# Patient Record
Sex: MALE | Race: WHITE | NOT HISPANIC OR LATINO | Employment: OTHER | URBAN - METROPOLITAN AREA
[De-identification: names, ages, dates, MRNs, and addresses within clinical notes are randomized per-mention and may not be internally consistent; named-entity substitution may affect disease eponyms.]

---

## 2023-09-08 PROBLEM — F84.0 AUTISM DISORDER (HHS-HCC): Status: ACTIVE | Noted: 2023-09-08

## 2023-09-08 PROBLEM — D64.9 ANEMIA: Status: ACTIVE | Noted: 2023-09-08

## 2023-09-08 PROBLEM — K66.0 INTESTINAL ADHESIONS: Status: ACTIVE | Noted: 2023-09-08

## 2023-09-08 PROBLEM — K08.109 EDENTULOUS: Status: ACTIVE | Noted: 2023-09-08

## 2023-09-08 PROBLEM — K59.00 CONSTIPATION: Status: ACTIVE | Noted: 2023-09-08

## 2023-09-08 PROBLEM — F98.4 STEREOTYPIC MOVEMENT DISORDER: Status: ACTIVE | Noted: 2023-09-08

## 2023-09-08 PROBLEM — K21.9 GERD (GASTROESOPHAGEAL REFLUX DISEASE): Status: ACTIVE | Noted: 2023-09-08

## 2023-09-08 PROBLEM — G25.71 AKATHISIA: Status: ACTIVE | Noted: 2023-09-08

## 2023-09-08 PROBLEM — R25.1 TREMOR: Status: ACTIVE | Noted: 2023-09-08

## 2023-09-08 PROBLEM — G24.4 OROFACIAL DYSKINESIA: Status: ACTIVE | Noted: 2023-09-08

## 2023-09-08 PROBLEM — F50.9 EATING DISORDER: Status: ACTIVE | Noted: 2023-09-08

## 2023-09-08 PROBLEM — F73 PROFOUND INTELLECTUAL DISABILITY: Status: ACTIVE | Noted: 2023-09-08

## 2023-09-08 PROBLEM — F63.9 IMPULSE CONTROL DISORDER: Status: ACTIVE | Noted: 2023-09-08

## 2023-09-08 PROBLEM — K56.600 PARTIAL BOWEL OBSTRUCTION (MULTI): Status: ACTIVE | Noted: 2023-09-08

## 2023-09-08 PROBLEM — F98.3 PICA OF INFANCY AND CHILDHOOD: Status: ACTIVE | Noted: 2023-09-08

## 2023-09-08 RX ORDER — DIVALPROEX SODIUM 125 MG/1
CAPSULE, COATED PELLETS ORAL
COMMUNITY
Start: 2019-02-12 | End: 2023-11-15 | Stop reason: SDUPTHER

## 2023-09-08 RX ORDER — PETROLATUM,WHITE/LANOLIN
OINTMENT (GRAM) TOPICAL
COMMUNITY
Start: 2018-11-15

## 2023-09-08 RX ORDER — POLYETHYLENE GLYCOL 3350 17 G/17G
POWDER, FOR SOLUTION ORAL
COMMUNITY
Start: 2019-08-12

## 2023-09-08 RX ORDER — RISPERIDONE 1 MG/1
TABLET ORAL
COMMUNITY
Start: 2016-11-09 | End: 2023-11-15 | Stop reason: SDUPTHER

## 2023-09-08 RX ORDER — CLONAZEPAM 0.5 MG/1
TABLET ORAL
COMMUNITY
Start: 2017-11-24 | End: 2023-11-15 | Stop reason: SDUPTHER

## 2023-09-08 RX ORDER — ACETAMINOPHEN 500 MG
TABLET ORAL
COMMUNITY

## 2023-09-08 RX ORDER — MAG HYDROX/ALUMINUM HYD/SIMETH 200-200-20
SUSPENSION, ORAL (FINAL DOSE FORM) ORAL
COMMUNITY

## 2023-09-08 RX ORDER — ZINC GLUCONATE 100 MG
1 TABLET ORAL DAILY
COMMUNITY
Start: 2020-04-22

## 2023-09-08 RX ORDER — FERROUS SULFATE 325(65) MG
1 TABLET ORAL DAILY
COMMUNITY
Start: 2019-10-31

## 2023-09-08 RX ORDER — METOPROLOL TARTRATE 100 MG/1
1 TABLET ORAL 2 TIMES DAILY
COMMUNITY
Start: 2020-11-30 | End: 2023-11-15 | Stop reason: SDUPTHER

## 2023-09-08 RX ORDER — BISACODYL 5 MG
TABLET, DELAYED RELEASE (ENTERIC COATED) ORAL
COMMUNITY
Start: 2019-08-12

## 2023-09-08 RX ORDER — FAMOTIDINE 40 MG/1
TABLET, FILM COATED ORAL
COMMUNITY
Start: 2019-08-12

## 2023-09-08 RX ORDER — MAGNESIUM L-LACTATE 84 MG
TABLET, EXTENDED RELEASE ORAL
COMMUNITY
Start: 2019-08-12

## 2023-09-08 RX ORDER — PNV NO.95/FERROUS FUM/FOLIC AC 28MG-0.8MG
TABLET ORAL
COMMUNITY

## 2023-09-08 RX ORDER — POLYETHYLENE GLYCOL 3350
GRANULES (GRAM) MISCELLANEOUS
COMMUNITY
Start: 2018-11-15

## 2023-11-14 ENCOUNTER — TELEMEDICINE (OUTPATIENT)
Dept: BEHAVIORAL HEALTH | Facility: CLINIC | Age: 48
End: 2023-11-14
Payer: MEDICARE

## 2023-11-14 DIAGNOSIS — F63.9 IMPULSE CONTROL DISORDER: ICD-10-CM

## 2023-11-14 DIAGNOSIS — T43.505A ACUTE NEUROLEPTIC-INDUCED AKATHISIA: ICD-10-CM

## 2023-11-14 DIAGNOSIS — G25.71 ACUTE NEUROLEPTIC-INDUCED AKATHISIA: ICD-10-CM

## 2023-11-14 PROCEDURE — 99214 OFFICE O/P EST MOD 30 MIN: CPT | Performed by: PSYCHIATRY & NEUROLOGY

## 2023-11-14 NOTE — PROGRESS NOTES
"FaheemJosé    :  79        Patient Discussion/Summary    Session today included education of the patient, and caregivers, re the signs and symptoms of illness; and the risks, potential benefits, and expected and unexpected side effects of prescribed medications.      Chief Complaint    An interactive audio and video telecommunication system which permits real time communications between the patient (at the originating site) and provider (at the distant site) was utilized to provide this telehealth service.      \"He has been stable lately.\"     DIAGNOSES       Impulse control disorder, not otherwise specified  Autism spectrum disorder  Pica        Intellectual disability, profound, of unknown etiology       Gastroesophageal reflux disease  History of constipation  Iron deficiency anemia           PRESENT FOR APPOINTMENT          Patient     Patrick Huizar,  and caregiver over the last 5-6 years.     SUBJECTIVE      Last seen May 2023    No medication changes at that visit.     His Boost nutritional supplement has been changed to as needed only.  It is given if he does not eat full meal, lately has been taken 1-2 times per week.     Seems to drink Boost without issue.     Most recent psychotropic medication change was in 2021, at which time divalproex was increased to 1000 mg twice per day.     Otherwise, no apparent changes in medications since that time     Current medications include:    Clonazepam 0.5 mg three times per day  Vitamin D3 50 mcg once a day  Vitamin B12 1000 mcg per day   Chewable vitamin with Fe  Biscodyl 5 mg per day in morning  Ferrous sulfate 325 mg per day  Mag-tab 84 mg twice per day   Metoprolol tartrate 100 mg twice per day   Omeprazole 20 mg per day  Divalproex 1000 mg twice per day (sprinkles)   Glucosamine 1500 mg per day  Polyethylene glycol 17 grams twice per day  Risperidone 1 mg, two (2 mg) at bedtime  Zinc 100 mg per day    MOM prn if no BM after 3 " days    Mr. Mayen has been stable since his last visit.    There are few challenging behaviors since that time.   Although he continues to do some pacing, it seems unchanged and stable.  He is still standing or pacing at least 75% of his waking hours.   He is able to sit, but his longest duration of sitting is 15-20 minutes.   He is able to sit through a meal, but will get up to pace as soon as the food is gone.    He generally will wander room to room, walking slowly.   His pacing is not driven or rapid.   Some of pacing seems food-seeking, but most is not.   In the kitchen, he will take food if he sees it, and will sometimes look in the garbage, but does not open the refrigerator or pantry.      He has a short attention span for everything, and does not seem much interested in anything other than food. Does not like to hold objects unless they are food or related to food.     Fewer episodes of trying to take food from others, and frequency of pica attempts have been decreased.     Not much recent observation of saliva smearing. No recent ingestion of feces.     His sleep is stable.   Sleeps about 7-9 hours per night. Sometimes wakes on his own in the morning, or is awakened by staff, no later than 9 am. Occasional nap after lunch.       Recent weight is 143 pounds.        He is still not attending an off site day program. Of his 7 house mates, 5 attend a day program.   He continues to live with the [facility] residential provider since October 2018; at [facility] with 7 other men.      He wears Attends full time.     Has been having nearly daily BM     He has no speech, and little recent non-speech vocalization throughout his waking day.     Generally cooperative with hygiene.     Noted that he may develop some loose stools following ingestion of dairy products.          His guardian Deisy Alegria has moved to [place], but is actively engaged in Mr. Mayen' care, attending all team meetings     REVIEW OF  SYMPTOMS     No interim medical issues to report.     No interim urgent care or ED visits, or hospitalizations since last visit.     One interim dental visit. He is edentulous, but has annual visit.     Has a mechanical soft diet. Continues to try to take food from others at times.    No recent choking or coughing episodes.    There have been no changes reported in bowel or bladder function. No recent reports of constipation. He appears to have BM daily, or sometimes every other day, and can take MOM if no BM after two days.   Noted by staff that he may have loose stools after eating milk products, so these are somewhat avoided. He will usually have 60% of BMs on commode, with a routine toileting schedule. He will also sometimes urinate on the commode at times, but Attends are usually wet between visits.     PCP is Dr. Hernandez.          He has routine and mostly successful podiatry visits. Podiatrist makes home visits.   He is cooperative with care if given a snack during the visit.     No seizures in interim.   Had visit with Dr[facility] Pandya at [facility], had labs, no change in medications    Some ongoing rubbing of his eyes.    In the past, he would rub both of his eyes repeatedly, and is at risk for recurrent conjunctivitis.    No interim episodes of conjunctivitis     His current diagnostic impression includes:     GERD  Constipation (moderate)  Pervasive developmental disorder  Autism  Pica  Edentulous   Olgivie syndrome (megacolon)    No observed falls in interim.     He is walking well lately, and still doing some spinning when he walks. May spin in place many times per day lately. Will walk from one room to another and spin a time or two on the way, will spin when he gets there, and returns to original location. Seems to spin more in unfamiliar places or in community, less at home. Continues walking more confidently without support; occasional problems with curbs or barriers. No change in gait or balance. Appears that  metoprolol has not been held since the last visit.     PAIN       Unable to accurately report pain.     MEDICAL PROBLEMS     See above              SIDE EFFECTS           ? mildly restless         CHANGE IN TREATMENT     None since last visit              COMPLIANCE           Compliance typically good but staff dependent. His meds are given in or with food; and newer staff may sometimes struggle to get medications administered.      MENTAL STATUS EXAMINATION:    He was seen briefly with Mr. Lewis today.   Seems awake and alert, well dressed and groomed.   He was in the kitchen, entered dining room, poor eye contact with video visit.       Continues some movement of lower lip.    No overt jaw movements.   No other abn movements.    Neither arms nor legs appear stiff. No tremor or drooling seen.     Did not follow any verbalized requests per video.    No speech or other vocalizations.     After several minutes, walked out of room. Did not appear unsteady walking.     BEHAVIOR             MEMORY     Could not test           SENSORY                       COMMUNICATION           Mute        AFFECT             Seemed flat             MOOD      Could not assess               THOUGHT PROCESS             THOUGHT CONTENT           Could not assess          PSYCHOTIC SYMPTOMS                      ORIENTATION            Did not look to video to called name    CONCENTRATION             CALCULATION           None    FUND OF KNOWLEDGE          Appears to have profound ID     JUDGMENT     Poor     GAIT              Slow but seems steady     EPS             None seen, no tremor, no drooling, not stiff.     AIMS              5 based on chewing movements.    LABS REVIEWED    November 2023:    Glucose 113.  BUN 19/1.0.  Ammonia normal.   VPA level 53.6.  CBC normal    October 2022:    HgbA1C 5.4%. CBC and CMP normal. Ammonia normal. VPA 70     July 2021    CMP all normal. VPA 44 mcg/ml. Ammonia normal at 37.     April 2021:    CBC  normal    January 2021:    BUN/Cr 28/0.72. K 5.2 (slightly high). Ammonia normal. VPA low normal at 34.7.     November 2020:    CBC normal. CMP all normal, including previously elevated LFTs. TSH and Vit D level normal. K+ 5.4 (? hemolysis?). VPA level lower at 34.8 mcg/ml. Ammonia now normal.     Sept 2020:    CBC normal. ALT and AST both slightly elevated. Lipids and TSH normal. HgbA1C 5.8% (slightly high). Ammonia 66 (range 16-60. VPA 69.5 (range ).     mid-August 2019:    CBC grossly normal. VPA 41, ammonia normal (low).     August 2019:    CMP grossly normal, glucose 114 (non-fasting). VPA 53.8 mcg/ml. Ammonia normal. CBC grossly normal. UA normal except for trace ketones, and slightly elevated urobilinogen.     April 2019:    CBC normal except WBC 22.7. CMP normal. Lipid panel normal (HDL slightly low). VPA 26. Fe low at 25 (range ) Vit D normal at 71. Zinc borderline low at 59 (range ).     January 2019:    CBC and CMP normal     July 2018:    HgbA1C 5.6%    April 2018:    CMP normal. Fe studies normal. WBC 3900, rest of CBC normal. VPA 74. Zinc normal at 77 ug/dl.      January 2018:    HgbA1C 5.7     October 2017:    CMP normal. CBC normal. Fe studies normal. VPA 68.8 mcg/ml. Zinc 65 (normal)    June 2017:    FBS 75        EKG             None in interim; these are difficult/impossible to obtain without GA or physical restraint    January 2017:    NSR, rate 66 bpm, QTc 370 msec    October 2015:    Sinus, rate 90 bpm, QTc 392 msec     August 2014:    Sinus tachycardia, rate 108, QTc 402 msec. (at higher dose of risperidone)     June 2013:    NSR rate 97 bpm, QTc 437 msec.     He is found in [facility] but his record does not document clonazepam Rx(Some long term care failities do not report to [facility]). No other issues.     ASSESSMENT           Stable since last visit.    Has remained at or near his best behavioral baseline.     Plan no change in medications this visit.        Seems to be  tolerating current medication regimen without serious side effects.     It remains unclear to what extent some/much of his restlessness is akathisia from risperidone, but he is also taking beta blocker to address. No apparent low blood pressure issues or dizziness or falls.     Interim labs noted.   No interim EKG.   The latter would likely require GA     No interim seizures; and no interim ED visits or hospitalizations since last visit.     No reports of Parkinsonism, no tremor, drooling, or stiffness.       PLAN             problems treated   f/u requested to prevent relapse   medications renewed/re-ordered     1. No change in medications this visit.   2. Continue VS every other day and record. Notify MD for pulse < 55 bpm, or BP < 95 systolic   3. Continue to chart bowel and urinary status, also attempts at pica  4. Would like to get EKG but not possible without GA  5.  Discussed transition to psychiatrist at [facility] who treats several of his residential peers       TREATMENT TYPE           22252, videobased counseling and coordination of care 40 minutes with > 50% c/c with staff caregivers; addressing s/s of illness; risks/benefits/side effects of medications; and behavioral approaches to illness    Return in 6 months; and as needed

## 2023-11-15 RX ORDER — DIVALPROEX SODIUM 125 MG/1
1000 CAPSULE, COATED PELLETS ORAL 2 TIMES DAILY
Qty: 480 CAPSULE | Refills: 5 | Status: SHIPPED | OUTPATIENT
Start: 2023-11-15 | End: 2024-03-20 | Stop reason: SDUPTHER

## 2023-11-15 RX ORDER — RISPERIDONE 1 MG/1
2 TABLET ORAL NIGHTLY
Qty: 60 TABLET | Refills: 5 | Status: SHIPPED | OUTPATIENT
Start: 2023-11-15 | End: 2024-03-20 | Stop reason: SDUPTHER

## 2023-11-15 RX ORDER — METOPROLOL TARTRATE 100 MG/1
100 TABLET ORAL 2 TIMES DAILY
Qty: 60 TABLET | Refills: 5 | Status: SHIPPED | OUTPATIENT
Start: 2023-11-15 | End: 2024-03-20 | Stop reason: SDUPTHER

## 2023-11-15 RX ORDER — CLONAZEPAM 0.5 MG/1
0.5 TABLET ORAL 3 TIMES DAILY
Qty: 90 TABLET | Refills: 5 | Status: SHIPPED | OUTPATIENT
Start: 2023-11-15 | End: 2024-03-20 | Stop reason: SDUPTHER

## 2023-11-21 ENCOUNTER — OFFICE VISIT (OUTPATIENT)
Dept: URGENT CARE | Facility: CLINIC | Age: 48
End: 2023-11-21
Payer: MEDICARE

## 2023-11-21 VITALS — RESPIRATION RATE: 20 BRPM | TEMPERATURE: 97 F | HEART RATE: 74 BPM | OXYGEN SATURATION: 97 %

## 2023-11-21 DIAGNOSIS — M79.672 LEFT FOOT PAIN: Primary | ICD-10-CM

## 2023-11-21 PROCEDURE — 99203 OFFICE O/P NEW LOW 30 MIN: CPT | Performed by: PHYSICIAN ASSISTANT

## 2023-11-21 NOTE — PROGRESS NOTES
Subjective   Patient ID: José Mayen is a 48 y.o. male.    Patient is a 48-year-old male who is brought by his caregiver for evaluation of bruising that was noted to the dorsal aspect of the distal left foot this morning.  Patient has significant developmental delay and resides in a group home.  Patient's caregiver noted bruising to the patient's dorsal left foot when he arrived this morning.  Staff reports no known accident or injury and the patient has been bearing weight and ambulating normally.  Staff noted no swelling to the patient's left foot and the patient has given no indication of significant pain to same.      Foot Injury     The following portions of the chart were reviewed this encounter and updated as appropriate:       Review of Systems   Musculoskeletal:         Bruising to Dorsal Left Foot     Objective   Physical Exam  Vitals and nursing note reviewed.   Constitutional:       Appearance: Normal appearance. He is normal weight.   Cardiovascular:      Pulses: Normal pulses.   Pulmonary:      Effort: Pulmonary effort is normal.      Breath sounds: Normal breath sounds.   Musculoskeletal:         General: No swelling, tenderness, deformity or signs of injury. Normal range of motion.      Comments: Ecchymosis is noted to the dorsal left foot directly overlying the distal second, third and fourth metatarsals.  There is no soft tissue edema noted to the dorsal left foot.  No crepitus or deformity is noted with palpation.  Patient demonstrates good range of motion to the left toes.  The overlying skin is intact and there is no evidence of abrasion, laceration or other skin wound.  Remainder of exam to the left foot and ankle is unremarkable.  Gait and station is noted to be stable and symmetric.   Skin:     General: Skin is warm and dry.      Capillary Refill: Capillary refill takes less than 2 seconds.      Findings: Bruising present. No erythema.   Neurological:      General: No focal deficit  present.      Mental Status: He is alert and oriented to person, place, and time.   Psychiatric:         Mood and Affect: Mood normal.         Behavior: Behavior normal.         Thought Content: Thought content normal.         Judgment: Judgment normal.     Assessment/Plan   Physical exam findings as noted above.  X-ray left foot is negative for acute findings on my review of the images.  Supportive care instructions were discussed and the patient's caregiver was provided with contact information for Dr. Star Adams.  Caregiver was encouraged to schedule an appointment with Dr. Adams for further evaluation and management if he notes the patient developing any additional symptoms.  Caregiver verbalizes excellent understanding of the above instructions.    CLINICAL IMPRESSION:  Ecchymosis Left Foot    Diagnoses and all orders for this visit:  Left foot pain  -     XR foot left 3+ views; Future

## 2024-03-19 ENCOUNTER — TELEMEDICINE (OUTPATIENT)
Dept: BEHAVIORAL HEALTH | Facility: CLINIC | Age: 49
End: 2024-03-19
Payer: MEDICARE

## 2024-03-19 DIAGNOSIS — T43.505A ACUTE NEUROLEPTIC-INDUCED AKATHISIA: ICD-10-CM

## 2024-03-19 DIAGNOSIS — F63.9 IMPULSE CONTROL DISORDER: ICD-10-CM

## 2024-03-19 DIAGNOSIS — G25.71 ACUTE NEUROLEPTIC-INDUCED AKATHISIA: ICD-10-CM

## 2024-03-19 PROCEDURE — 99214 OFFICE O/P EST MOD 30 MIN: CPT | Performed by: PSYCHIATRY & NEUROLOGY

## 2024-03-19 NOTE — PROGRESS NOTES
"FaheemJosé    :  79        Patient Discussion/Summary    Session today included education of the patient, and caregivers, re the signs and symptoms of illness; and the risks, potential benefits, and expected and unexpected side effects of prescribed medications.      Chief Complaint    An interactive audio and video telecommunication system which permits real time communications between the patient (at the originating site) and provider (at the distant site) was utilized to provide this telehealth service.      \"He has been stable lately.\"     DIAGNOSES     Impulse control disorder, not otherwise specified  Autism spectrum disorder  Pica        Intellectual disability, profound, of unknown etiology       Gastroesophageal reflux disease  History of constipation  Iron deficiency anemia           PRESENT FOR APPOINTMENT          Patient     Patrick Huizar,  and caregiver over the last 6+ years.     SUBJECTIVE      Last seen 2023    No medication changes at that visit.     His Boost nutritional supplement has been changed to as needed only.  It is given if he does not eat full meal, lately has been taken 1-2 times per week.     Seems to drink Boost without issue.     Most recent psychotropic medication change was in 2021, at which time divalproex was increased to 1000 mg twice per day.     Otherwise, no apparent changes in medications since that time     Current medications include:    Clonazepam 0.5 mg three times per day  Vitamin D3 50 mcg once a day  Vitamin B12 1000 mcg per day   Chewable vitamin with Fe  Biscodyl 5 mg per day in morning  Ferrous sulfate 325 mg per day  Mag-tab 84 mg twice per day   Metoprolol tartrate 100 mg twice per day   Omeprazole 20 mg per day  Divalproex 1000 mg twice per day (sprinkles)   Glucosamine 1500 mg per day  Polyethylene glycol 17 grams twice per day  Risperidone 1 mg, two (2 mg) at bedtime  Zinc 100 mg per day    MOM prn if no BM after 3 " days    Mr. Mayen is unchanged since his last visit.    There are few recent challenging behaviors.   No active aggression, and no SIB.      Although he continues to do some pacing, it seems decreased.    More able to sit, particularly after getting a drink.   Can sit for up to 15 minutes at a time then.   Otherwise, he is still standing or pacing many waking hours.    He is able to sit through a meal, but will sometimes get up to pace as soon as the food is gone.   He makes initial attempts to feed himself with adaptive utensils.   Seems to lose interest and then staff have to feed him rest of the meal, using hand over hand technique.  Eats too fast when he feeds himself.       He generally will wander room to room, walking slowly.   His pacing is not driven or rapid.   Some of pacing seems food-seeking, but most is not.   In the kitchen, he will take food if he sees it.  No longer will look in the garbage, and does not open the refrigerator or pantry.      He has a short attention span for everything, and does not seem much interested in anything other than food. Does not like to hold objects unless they are food or related to food.     Fewer episodes of trying to take food from others, and frequency of pica attempts have been decreased.     Not much recent observation of saliva smearing. No recent ingestion of feces.     His sleep is stable.   Sleeps about 7-9 hours per night. Sometimes wakes on his own in the morning, or is awakened by staff, no later than 9 am. Occasional nap after lunch.       Recent weight is 144 pounds.        He is not attending an off site day program.    Of his 7 house mates, 5 attend a day program.   He continues to live with the [place] residential provider since October 2018; at [facility] with 7 other men.      He wears Attends full time.     Has been having nearly daily BM     He has no speech, and little recent non-speech vocalization throughout his waking day.      Generally cooperative with hygiene.     Noted that he may develop some loose stools following ingestion of dairy products.          His guardian Deisy Alegria lives outside of [place], she remains actively engaged in Mr. Mayen' care, attending all team meetings     REVIEW OF SYMPTOMS     In late December 2023, seen in ED at [facility] for fracture of right hand.  Needed to wear splint for 6 weeks.   Took some analgesics as needed for a time.   Splint did not seem to bother him, did not try to remove.    Seems to have healed fully, no subsequent problems using right hand.       No other interim medical issues to report.     No other interim urgent care or ED visits, or hospitalizations since last visit.     No interim dental visit. He is edentulous, but has annual visit.     Has a mechanical soft diet.   Continues to try to take food from others at times.    No recent choking or coughing episodes.    There have been no changes reported in bowel or bladder function. No recent reports of constipation. He appears to have BM daily, or sometimes every other day, and can take MOM if no BM after two days.   Noted by staff that he may have loose stools after eating milk products, so these are somewhat avoided. He will usually have 60% of BMs on commode, with a routine toileting schedule. He will also sometimes urinate on the commode at times, but Attends are usually wet between visits.     PCP is Dr. Hernandez.          He has routine and mostly successful podiatry visits. Podiatrist makes home visits.   He is cooperative with care if given a snack during the visit.     No seizures in interim.   No interim visit with Dr. Pandya at [facility].   Has visit pending.       Some ongoing rubbing of his eyes.    In the past, he would rub both of his eyes repeatedly, and is at risk for recurrent conjunctivitis.    No interim episodes of conjunctivitis     His current diagnostic impression includes:     GERD  Constipation  (moderate)  Pervasive developmental disorder  Autism  Pica  Edentulous   Olgivie syndrome (megacolon)    No observed falls in interim.   It was not clear whether he had fallen as etiology for hand fracture.   No other obvious injury as would have likely been involved in a fall.      He is walking well lately, and still doing some spinning when he walks. May spin in place many times per day lately. Will walk from one room to another and spin a time or two on the way, will spin when he gets there, and returns to original location. Seems to spin more in unfamiliar places or in community, less at home. Continues walking more confidently without support; occasional problems with curbs or barriers. No change in gait or balance. Appears that metoprolol has not been held since the last visit.     PAIN       Unable to accurately report pain.     MEDICAL PROBLEMS     See above              SIDE EFFECTS           ? mildly restless         CHANGE IN TREATMENT     None since last visit              COMPLIANCE           Compliance typically good but staff dependent. His meds are given in or with food; and newer staff may sometimes struggle to get medications administered.      MENTAL STATUS EXAMINATION:    He was seen on video with Mr. Lewis today.    Seems awake and alert, well dressed and groomed.   Seen in living area, was sitting when approached.       Poor eye contact with video visit.       Continues some apparently involuntary movement of lower lip.    No apparent  jaw movements.   No other abn movements.    Neither arms nor legs appear stiff. No tremor or drooling seen.     Did not follow any verbalized requests per video.    No speech or other vocalizations.     Did not appear unsteady standing    BEHAVIOR             MEMORY     Could not test           SENSORY                       COMMUNICATION           Mute        AFFECT             Seemed flat             MOOD      Could not assess               THOUGHT  PROCESS             THOUGHT CONTENT           Could not assess          PSYCHOTIC SYMPTOMS                      ORIENTATION            Did not look to video to called name    CONCENTRATION             CALCULATION           None    FUND OF KNOWLEDGE          Appears to have profound ID     JUDGMENT     Poor     GAIT              Slow but seems steady     EPS             None seen, no tremor, no drooling, not stiff.     AIMS              5-6 based on chewing movements.    LABS REVIEWED    None in interim    November 2023:    Glucose 113.  BUN 19/1.0.  Ammonia normal.   VPA level 53.6.  CBC normal    October 2022:    HgbA1C 5.4%. CBC and CMP normal. Ammonia normal. VPA 70     July 2021    CMP all normal. VPA 44 mcg/ml. Ammonia normal at 37.     April 2021:    CBC normal    January 2021:    BUN/Cr 28/0.72. K 5.2 (slightly high). Ammonia normal. VPA low normal at 34.7.     November 2020:    CBC normal. CMP all normal, including previously elevated LFTs. TSH and Vit D level normal. K+ 5.4 (? hemolysis?). VPA level lower at 34.8 mcg/ml. Ammonia now normal.     Sept 2020:    CBC normal. ALT and AST both slightly elevated. Lipids and TSH normal. HgbA1C 5.8% (slightly high). Ammonia 66 (range 16-60. VPA 69.5 (range ).     mid-August 2019:    CBC grossly normal. VPA 41, ammonia normal (low).     August 2019:    CMP grossly normal, glucose 114 (non-fasting). VPA 53.8 mcg/ml. Ammonia normal. CBC grossly normal. UA normal except for trace ketones, and slightly elevated urobilinogen.     April 2019:    CBC normal except WBC 22.7. CMP normal. Lipid panel normal (HDL slightly low). VPA 26. Fe low at 25 (range ) Vit D normal at 71. Zinc borderline low at 59 (range ).     January 2019:    CBC and CMP normal     July 2018:    HgbA1C 5.6%    April 2018:    CMP normal. Fe studies normal. WBC 3900, rest of CBC normal. VPA 74. Zinc normal at 77 ug/dl.      January 2018:    HgbA1C 5.7     October 2017:    CMP normal. CBC  normal. Fe studies normal. VPA 68.8 mcg/ml. Zinc 65 (normal)    June 2017:    FBS 75        EKG             None in interim; these are difficult/impossible to obtain without GA or physical restraint    January 2017:    NSR, rate 66 bpm, QTc 370 msec    October 2015:    Sinus, rate 90 bpm, QTc 392 msec     August 2014:    Sinus tachycardia, rate 108, QTc 402 msec. (at higher dose of risperidone)     June 2013:    NSR rate 97 bpm, QTc 437 msec.     He is [facility] but his record does not document clonazepam Rx([facility] fail[facility] No other issues.     ASSESSMENT           Appears overall unchanged since last visit.    Remains at or near his best behavioral baseline.     Plan no change in medications this visit.        Seems to be tolerating current medication regimen without serious side effects.     It remains unclear to what extent some/much of his restlessness is akathisia from risperidone, but he is also taking beta blocker to address. No apparent low blood pressure issues or dizziness or falls.     No interim labs or EKG.  The latter would likely require GA     No interim seizures.    One  interim ED visit for unobserved hand fracture, healed.    No observed falls.       No reports of Parkinsonism, no tremor, drooling, or stiffness.     Discussed transition plan to Dr. Alcantar at  [facility].    PLAN             problems treated   f/u requested to prevent relapse   medications renewed/re-ordered     1. No change in medications this visit.   2. Continue VS every other day and record. Notify MD for pulse < 55 bpm, or BP < 95 systolic   3. Continue to chart bowel and urinary status, also attempts at pica  4. Would like to get EKG but not possible without GA  5. Transition of psychiatric care.        TREATMENT TYPE           [address], videobased counseling and coordination of care 40 minutes with > 50% c/c with staff caregivers; addressing s/s of illness;  risks/benefits/side effects of medications; and behavioral approaches to illness    Return in 6 months; and as needed

## 2024-03-20 RX ORDER — METOPROLOL TARTRATE 100 MG/1
100 TABLET ORAL 2 TIMES DAILY
Qty: 60 TABLET | Refills: 5 | Status: SHIPPED | OUTPATIENT
Start: 2024-03-20

## 2024-03-20 RX ORDER — DIVALPROEX SODIUM 125 MG/1
1000 CAPSULE, COATED PELLETS ORAL 2 TIMES DAILY
Qty: 480 CAPSULE | Refills: 5 | Status: SHIPPED | OUTPATIENT
Start: 2024-03-20

## 2024-03-20 RX ORDER — CLONAZEPAM 0.5 MG/1
0.5 TABLET ORAL 3 TIMES DAILY
Qty: 90 TABLET | Refills: 5 | Status: SHIPPED | OUTPATIENT
Start: 2024-03-20

## 2024-03-20 RX ORDER — RISPERIDONE 1 MG/1
2 TABLET ORAL NIGHTLY
Qty: 60 TABLET | Refills: 5 | Status: SHIPPED | OUTPATIENT
Start: 2024-03-20

## 2024-09-03 ENCOUNTER — APPOINTMENT (OUTPATIENT)
Dept: BEHAVIORAL HEALTH | Facility: CLINIC | Age: 49
End: 2024-09-03
Payer: MEDICARE

## 2024-09-16 ENCOUNTER — APPOINTMENT (OUTPATIENT)
Dept: BEHAVIORAL HEALTH | Facility: CLINIC | Age: 49
End: 2024-09-16
Payer: MEDICARE

## 2024-09-16 DIAGNOSIS — Z86.59 PSYCHIATRIC FOLLOW-UP: ICD-10-CM

## 2024-09-16 DIAGNOSIS — G24.4 OROFACIAL DYSKINESIA: ICD-10-CM

## 2024-09-16 DIAGNOSIS — F63.9 IMPULSE CONTROL DISORDER: ICD-10-CM

## 2024-09-16 DIAGNOSIS — Z09 PSYCHIATRIC FOLLOW-UP: ICD-10-CM

## 2024-09-16 DIAGNOSIS — F50.89 PICA: ICD-10-CM

## 2024-09-16 DIAGNOSIS — K59.09 CHRONIC CONSTIPATION: ICD-10-CM

## 2024-09-16 DIAGNOSIS — F84.0 AUTISM SPECTRUM DISORDER (HHS-HCC): ICD-10-CM

## 2024-09-16 DIAGNOSIS — F73 PROFOUND INTELLECTUAL DISABILITY: ICD-10-CM

## 2024-09-16 DIAGNOSIS — K21.9 GASTROESOPHAGEAL REFLUX DISEASE, UNSPECIFIED WHETHER ESOPHAGITIS PRESENT: ICD-10-CM

## 2024-09-16 PROCEDURE — 90792 PSYCH DIAG EVAL W/MED SRVCS: CPT | Performed by: STUDENT IN AN ORGANIZED HEALTH CARE EDUCATION/TRAINING PROGRAM

## 2024-09-16 NOTE — PATIENT INSTRUCTIONS
AFTER VISIT SUMMARY      Date: 9/16/2024  Appointment with Psychiatrist - Dr. Alcantar      Reason for Visit:  -Routine follow-up/Medication management      Discussed during Appointment:   -Physical health, psychiatric/behavioral symptoms, daily functioning, new issues/concerns     -Treatment plan/management, including medication      Clinical Impression/Status Update:  Patient appears to have remained psychiatrically and behaviorally stable since last visit.  No report of new issues/concerns.  -Current medication regimen appears to be appropriately effective without experiencing significant or bothersome side effects.  --We will continue current medications and see him again in 3 months.      -Risk/Benefit assessment:   Medical necessity for continued treatment with psychotropic medication:   There is no report of signs/symptoms consistent with medication-induced impairment in daily functioning. At this time, benefits of medication felt to outweigh potential risks. But we will continue to reassess need for psychotropic medication at regular 3 to 6 month intervals, or sooner as clinically indicated.      #Clinic Policies/Procedures  -Refills  Prescriptions will be sent to your pharmacy with enough refills to last until your next appointment. For established patients, we typically provide 6 refills for regular meds and 3 refills for controlled substances (e.g., ADHD stimulant medication, benzodiazepines such as lorazepam). We will not provide additional refills beyond that without having an appointment. You can schedule an appointment by sending a [employer] message or calling our office at [phone].     -Paperwork Requests (e.g., [employer] for guardianship)  We ask that you please schedule an appointment if needing paperwork filled out by the doctor (e.g., [employer], FMLA form).  Please provide as much information as possible about your request and email the doctor any forms needing to be filled out prior to  the appointment.       ===========================  INSTRUCTIONS/RECOMMENDATIONS  ===========================    #Medication   -No medication changes made today  --Continue taking your psych medications the same as usual    --Refills will be sent to your pharmacy    >>For prior authorization issues at the pharmacy -> Call the office and ask to talk to Nurse Olivas.      #Follow-up  --Your next appointment is scheduled for 1/20/2025 at 3:00 pm (virtual visit with [facility])    *If having new/worsening symptoms, please send a [employer] message or call the clinic ([phone]) to discuss being seen sooner   >>If unable to reach the office, send me an email at [email or web address]

## 2024-09-16 NOTE — PROGRESS NOTES
Others Attending Appointment:  -Rashaad ()  *Presence necessary as independent historian given patient's intellectual/developmental disability and/or impaired communication abilities    LAST INTERVENTION   -Last seen in Dr. Swain's clinic about 6 months ago in March 2024.  -Patient was his psychiatric/behavioral baseline   -No med changes      CHIEF COMPLAINT  -New patient evaluation  --For transition of care in setting of psychiatrist retiring    _____________________________________________  HISTORY OF PRESENT ILLNESS    #Interval Change  -Patient appears to have remained psychiatrically and behaviorally stable since last visit.  -No report of new issues/concerns  -Patient reportedly doesn't like to drink water. Manager reports they given him flavored water to improve intake.  -Patient used to be food seeking but this is currently well controlled.  -Patient still paces but not worse than usual.    #Mood/Irritability  -Stable  -No report of significant changes in mood, crying spells, frequent mood swings, or  significant irritability.  -No report of concern for depression from staff/family.  -Does not endorse cardinal signs/symptoms of major depressive disorder.    #Behavior  -Stable  -Manager reports that patient still paces but not worse than prior.  -No report of physical aggression, significant property destruction, elopement, or self-  injurious behavior.  -No report of significant issues with anger or impulse control.    #Anxiety  -Stable  -No report of excessive worrying, perseverating, or reassurance-seeking behavior.  -No report of significant restlessness, pacing, or other signs suggesting psychomotor  agitation.  -No report of signs/symptoms consistent with separation anxiety or panic attacks.          #Medication  -Stable  -Endorses medication adherence.  -No report of concerns for medication side effects.  -No report of significant issues with constipation (beyond baseline chronic  constipation),  excessive sedation, drooling, dizziness, tremors, rigidity, or shuffling gait.  -Patient/caregiver report that current medication regimen is felt to be effective and  beneficial.  -Patient/caregiver report strong preference for not making medication changes at this  time.    #Health  -Stable  -No report of hospitalizations, ED visits, new/worsening medical issues, or changes in  non-psych medication since last visit  -Patient maintains regular follow up appointments with other multiple providers for her  complex medical co-morbidities. No report of problem-based visits outside of regularly  scheduled maintenance.    _________________________  REVIEW OF SYMPTOMS  -Depression/Mood: negative  -Anxiety: negative  -Psychosis: negative  -Harleen: negative  -ADHD: negative  -ODD: negative  -OCD: negative  -Sexually inappropriate behavior: none reported  -Sleep: No issues reported. No changes from baseline.  -Appetite: No issues reported. No report of pica. No changes from baseline  -Medical ROS: no report of difficulty urinating, seizures, rash, polydipsia, or constipation  beyond baseline.  *All other systems have been reviewed and are negative for complaint unless otherwise  noted above    ______________________________  History  For detailed history, refer to note from Dr. Swain on 3/19/2024  History reviewed with patient/caregiver and from chart review.  PSYCHIATRIC/BEHAVIORAL HISTORY  -Prior diagnoses:  --Impulse control disorder, not otherwise specified  --Autism spectrum disorder  --Pica of infancy and childhood  --Intellectual disability, profound, or unknown etiology  --Eating disorder  --Stereotypic movement disorder  --Akathisia  -Was previously seeing psychiatrist - Dr. Swain  -History of violence: no  -History of self-injurious behavior: no  -Current psych meds:  --Clonazepam 0.5 mg three times per day (6:00, 14:00, 19:00)  --Metoprolol tartrate 100 mg twice per day (7:00,  19:00)  --Divalproex 100 mg twice per day (sprinkles) (7:00, 19:00)  --Risperidone 1 mg, two (2 mg) at bedtime (19:00)      SOCIAL HISTORY  -Living situation: lives in a group with 7 roommates (all men). Gets along well with roommates. Has been at the home for 8 to 10 years.  -Family involvement: minimal involvement   -Guardianship status: Bianca Alegria (a friend of the family)  -Does not endorse smoking, ETOH, or illicit drug use. No reported history of past  substance abuse.      PAST MEDICAL HISTORY  -Chronic medical comorbidities  --Edentulous  --GERD  --History of constipation  --Intestinal adhesions  --Partial bowel obstruction  --Iron deficiency anemia  --Left foot pain  --Fall in December 2023  --No past reported history of seizures or cardiac issues  -PCP: Mariano Hernandez MD      CURRENT MEDICATIONS  -Info reviewed with patient/caregiver    ALLERGIES  -No known drug allergies    PHARMACY  -[facility]    FAMILY HISTORY  -Family history reviewed; Per , father had schizophrenia and 2 siblings has IDD     ______________________  Mental Status Exam:  Appearance: adequate grooming  Eye Contact: avoidant  Demeanor:  withdrawn  Motor Activity: Average, no PMA/PMR.  Musculoskeletal: No TD, tics, or tremor appreciated. AIMS score 4 (chewing-like mvts)  Mood: euthymic  Affect: restricted  Speech: mostly ”non-verbal”,   Thought Process: Unable to fully assess given patient with intellectual/developmental  disability and/or impaired communication abilities.  Thought Content: Unable to fully assess given patient with intellectual/developmental  disability and/or impaired communication abilities.  Thought Perception: Does not appear to be responding to hallucinatory stimuli.  Orientation: alert  Attention/Concentration: average  Cognition:  intellectual disability, estimated as profound per previous psychiatrist  Insight: impaired, in setting of intellectual/developmental disability  Judgement: impaired,  in setting of intellectual/developmental disability    ____________________________  Risk Assessment:  Patient felt to be at low acute and imminent risk of harm to self and others based on  consideration of their risk and protective factors, as well as evaluation of their current  clinical condition. Patient does not currently meet  criteria for inpatient psychiatric hospitalization given that they do not exhibit evidence of  clinically significant deterioration in baseline psychiatric illness, aggression, self-injury, or  ability to care for themselves. There is no evidence that patient's current  caregivers/living environment are unable to safely manage patient's behavior.  Outpatient management is currently felt to be appropriate and in the best interest of the  patient. Overall risk mitigated by psychiatric follow-up care, use of psychotropic  medication, 24/7 supervision, guardianship, and [place] Board services.  Have engaged patient/caregivers in safety planning. They are aware of emergency  psychiatric resources available in the event of an acute psychiatric emergency, such as  Mobile Crisis, 911, and local ER.        _________________________________  IMPRESSION  Male with intellectual disability and reported history of pica presenting for new patient evaluation, to establish care for management of psychotropic medication. Previously seeing  Dr. Swain who is retiring.      ###  As of this appointment:  Patient appears to have remained psychiatrically and behaviorally stable since last visit.  No report of new issues/concerns.  Tolerating medication regimen without report of significant side effects.  Will continue current medication regimen with plan for follow-up interval q3 months for medication management.  In the future, will plan to discuss possible genetic testing given report of 2 sisters with IDD  ###        Diagnoses:  -Impulse control disorder   -Autism spectrum disorder   -Pica    -Gastroesophageal reflux disease  -Orofacial dyskinesia   -Profound intellectual disability   -Chronic constipation          PLAN / MANAGEMENT / RECOMMENDATIONS    #Visit Complexity   -Visit of high complexity given patient's intellectual/developmental disability and/or  impaired communication abilities resulting in need for the presence of a third party (staff) to  provide clinical information and history.    #Medication Management  -Continue:  -Current psych meds:  --Clonazepam 0.5 mg three times per day (6:00, 14:00, 19:00)  --Metoprolol tartrate 100 mg twice per day (7:00, 19:00)  --Divalproex 100 mg twice per day (sprinkles) (7:00, 19:00)  --Risperidone 1 mg, two (2 mg) at bedtime (19:00)    #Medication Considerations   -Medication consent/assent:  Risks, benefits, alternatives, off-label uses, black box warnings, and frequent/important side  effects of medications have been discussed with patient/caregiver. To the extent possible, they  have voiced understanding and agreement with recommended use of psychotropic medication.  -Medical necessity for continued treatment with psychotropic medication:  []Symptom reduction  []Improvement in functioning  []Reduce risk of harm to self/others  [x]Maintenance therapy to prevent deterioration in functioning    -Rational for not reducing medication dose at this time:  [x]Significant risk of deterioration in functioning  []Concern for elevating risk of harm to self/others  []Prior dose reduction unsuccessful and/or harmful  []Psychiatric/behavioral condition not adequately stabilized/optimized  []Medication regimen recently modified  []Other    -OARRS  --I have personally reviewed patient's OARRS report. I have considered the risks of  abuse, dependence, addiction, and diversion and feel that the potential benefits of  treatment with a controlled substance currently outweigh the potential risks.    -Risk/Benefit assessment:   here is no report of signs/symptoms  consistent with medication-induced impairment in  daily functioning. At this time, benefits of medication felt to outweigh potential risks. But  we will continue to reassess need for psychotropic medication at regular 3 to 6 month  intervals, or sooner as clinically indicated.    #Medication Monitoring Plan  -Yearly monitoring next due: may 2025  --Labs to monitor: CBC, CMP, TSH/T4, lipid panel, Hgb A1c, VPA level      #MDM/Complexity Issues  [x]Chronic medical comorbidities  []Chronic risk of harm to self/others  [x]Intellectual/Developmental disability  [x]Need for independent historian due to intellectual/developmental disability and/or impaired communication abilities  [x]Controlled substance medication requiring regular monitoring  [x]Medication requiring significant ongoing monitoring for toxicity    #Counseling Provided  [x]Diagnostic impression/prognosis  [x]Risks and benefits of treatment options  [x]Instruction for management/treatment and follow-up  [x]Educated patient/caregiver about: behavioral interventions, sleep hygiene, safety planning    #Coordination of care provided  []Family  [x]Caregiver/DSP/Staff  []Agency supervisor  []Nursing staff  []SSA/  []Therapist  [x]Guardian    #Follow-up  -in about 3 months, or sooner if new/worsening symptoms  >>> Scheduled virtual Follow-up Appt on 1/20/2025 at 15:00    ______________  Time  -Prep time on date of the patient encounter: 10 minutes  -Time spent directly with patient/family/caregiver: 50 minutes  -Additional time spent on patient care activities: 15 minutes  -Documentation time: 15 minutes  -Total time on date of patient encounter: 90 minutes         Scribe Attestation  By signing my name below, I, Lois Darden, Scribe   attest that this documentation has been prepared under the direction and in the presence of Sabrina Alcantar DO.

## 2024-09-17 RX ORDER — RISPERIDONE 1 MG/1
2 TABLET ORAL NIGHTLY
Qty: 60 TABLET | Refills: 5 | Status: CANCELLED | OUTPATIENT
Start: 2024-09-17

## 2024-10-10 DIAGNOSIS — F84.0 AUTISM SPECTRUM DISORDER (HHS-HCC): ICD-10-CM

## 2024-10-10 DIAGNOSIS — F63.9 IMPULSE CONTROL DISORDER: ICD-10-CM

## 2024-10-10 DIAGNOSIS — Z91.89 AT RISK FOR SIDE EFFECT OF MEDICATION: ICD-10-CM

## 2024-10-10 DIAGNOSIS — F41.3 OTHER MIXED ANXIETY DISORDERS: ICD-10-CM

## 2024-10-10 DIAGNOSIS — F73 PROFOUND INTELLECTUAL DISABILITY: ICD-10-CM

## 2024-10-10 DIAGNOSIS — G25.71 AKATHISIA: ICD-10-CM

## 2024-10-10 DIAGNOSIS — F50.89 PICA: ICD-10-CM

## 2024-10-10 RX ORDER — RISPERIDONE 2 MG/1
TABLET ORAL
Qty: 30 TABLET | Refills: 6 | Status: SHIPPED | OUTPATIENT
Start: 2024-10-10

## 2024-10-10 RX ORDER — CLONAZEPAM 0.5 MG/1
TABLET ORAL
Qty: 90 TABLET | Refills: 2 | Status: SHIPPED | OUTPATIENT
Start: 2024-10-10

## 2024-10-10 RX ORDER — METOPROLOL TARTRATE 100 MG/1
TABLET ORAL
Qty: 60 TABLET | Refills: 6 | Status: SHIPPED | OUTPATIENT
Start: 2024-10-10

## 2024-11-04 PROBLEM — Z86.59 PSYCHIATRIC FOLLOW-UP: Status: ACTIVE | Noted: 2024-11-04

## 2024-11-04 PROBLEM — K59.09 CHRONIC CONSTIPATION: Status: ACTIVE | Noted: 2023-09-08

## 2024-11-04 PROBLEM — Z09 PSYCHIATRIC FOLLOW-UP: Status: ACTIVE | Noted: 2024-11-04

## 2025-01-06 DIAGNOSIS — F63.9 IMPULSE CONTROL DISORDER: ICD-10-CM

## 2025-01-06 DIAGNOSIS — F41.3 OTHER MIXED ANXIETY DISORDERS: ICD-10-CM

## 2025-01-06 RX ORDER — CLONAZEPAM 0.5 MG/1
TABLET ORAL
Qty: 90 TABLET | Refills: 2 | Status: SHIPPED | OUTPATIENT
Start: 2025-01-06

## 2025-01-20 ENCOUNTER — APPOINTMENT (OUTPATIENT)
Dept: BEHAVIORAL HEALTH | Facility: CLINIC | Age: 50
End: 2025-01-20
Payer: MEDICARE

## 2025-01-20 DIAGNOSIS — F98.4 STEREOTYPIC MOVEMENT DISORDER: ICD-10-CM

## 2025-01-20 DIAGNOSIS — Z09 PSYCHIATRIC FOLLOW-UP: ICD-10-CM

## 2025-01-20 DIAGNOSIS — Z91.89 AT RISK FOR SIDE EFFECT OF MEDICATION: ICD-10-CM

## 2025-01-20 DIAGNOSIS — G24.01 TARDIVE DYSKINESIA: ICD-10-CM

## 2025-01-20 DIAGNOSIS — F41.3 OTHER MIXED ANXIETY DISORDERS: ICD-10-CM

## 2025-01-20 DIAGNOSIS — K59.09 CHRONIC CONSTIPATION: ICD-10-CM

## 2025-01-20 DIAGNOSIS — K21.9 GASTROESOPHAGEAL REFLUX DISEASE, UNSPECIFIED WHETHER ESOPHAGITIS PRESENT: ICD-10-CM

## 2025-01-20 DIAGNOSIS — R69 MULTIPLE COMORBID CONDITIONS: ICD-10-CM

## 2025-01-20 DIAGNOSIS — F73 PROFOUND INTELLECTUAL DISABILITY: ICD-10-CM

## 2025-01-20 DIAGNOSIS — E55.9 VITAMIN D INSUFFICIENCY: ICD-10-CM

## 2025-01-20 DIAGNOSIS — F84.0 AUTISM SPECTRUM DISORDER (HHS-HCC): ICD-10-CM

## 2025-01-20 DIAGNOSIS — F50.89 PICA: ICD-10-CM

## 2025-01-20 DIAGNOSIS — Z86.59 PSYCHIATRIC FOLLOW-UP: ICD-10-CM

## 2025-01-20 DIAGNOSIS — F63.9 IMPULSE CONTROL DISORDER: ICD-10-CM

## 2025-01-20 DIAGNOSIS — Z86.39 HISTORY OF NON ANEMIC VITAMIN B12 DEFICIENCY: ICD-10-CM

## 2025-01-20 DIAGNOSIS — Z79.899 LONG TERM CURRENT USE OF ANTIPSYCHOTIC MEDICATION: ICD-10-CM

## 2025-01-20 DIAGNOSIS — Z86.69 HISTORY OF SEIZURE DISORDER: ICD-10-CM

## 2025-01-20 DIAGNOSIS — G25.71 AKATHISIA: ICD-10-CM

## 2025-01-20 DIAGNOSIS — Z86.39 HISTORY OF IRON DEFICIENCY: ICD-10-CM

## 2025-01-20 DIAGNOSIS — Z86.39 HISTORY OF VITAMIN D DEFICIENCY: ICD-10-CM

## 2025-01-20 PROCEDURE — 1036F TOBACCO NON-USER: CPT | Performed by: STUDENT IN AN ORGANIZED HEALTH CARE EDUCATION/TRAINING PROGRAM

## 2025-01-20 PROCEDURE — 99215 OFFICE O/P EST HI 40 MIN: CPT | Performed by: STUDENT IN AN ORGANIZED HEALTH CARE EDUCATION/TRAINING PROGRAM

## 2025-01-20 RX ORDER — CLONAZEPAM 0.5 MG/1
TABLET ORAL
Qty: 90 TABLET | Refills: 2 | Status: SHIPPED | OUTPATIENT
Start: 2025-01-20

## 2025-01-20 RX ORDER — RISPERIDONE 2 MG/1
TABLET ORAL
Qty: 30 TABLET | Refills: 6 | Status: SHIPPED | OUTPATIENT
Start: 2025-01-20

## 2025-01-20 RX ORDER — METOPROLOL TARTRATE 100 MG/1
TABLET ORAL
Qty: 60 TABLET | Refills: 6 | Status: SHIPPED | OUTPATIENT
Start: 2025-01-20

## 2025-01-20 RX ORDER — DIVALPROEX SODIUM 125 MG/1
CAPSULE, COATED PELLETS ORAL
Qty: 480 CAPSULE | Refills: 6 | Status: SHIPPED | OUTPATIENT
Start: 2025-01-20

## 2025-01-20 NOTE — PROGRESS NOTES
Others Attending Appointment:  -Rashaad ()  *Presence necessary as independent historian given patient's intellectual/developmental disability and/or impaired communication abilities    LAST INTERVENTION   -Last seen about 4 months ago in September 2024.  -Patient was his psychiatric/behavioral baseline   -No med changes      _____________________________________________  HISTORY OF PRESENT ILLNESS    #Interval Change  -No report of significant change in patient's overall psychiatric and behavioral condition since last visit.  -No report of significant new issues/concerns.      #Mood/Irritability  Stable.  -No report of significant changes in mood, crying spells, frequent mood swings, or  significant irritability.  -No report of concern for depression from staff/family.    #Behavior  -Stable. No report of behavioral concerns from caregiver.  -Pacing described as no worse than baseline.  -No report of physical aggression, significant property destruction, elopement, or self-  injurious behavior.        #Anxiety  -Patient reportedly experiences anxiety in setting of being in public places but not worse than usual.  -No report of excessive worrying, perseverating, or reassurance-seeking behavior.  -No report of significant restlessness, pacing, or other signs suggesting psychomotor  agitation.  -No report of signs/symptoms consistent with separation anxiety or panic attacks.      #Medication  -Endorses medication adherence.  -No report of new or significant/bothersome medication side effects.   -Patient/caregiver report that current medication regimen is felt to be effective and  beneficial.  -Patient/caregiver report strong preference for not making medication changes at this  time.    #Health  Stable.   -No report of hospitalizations, ED visits, new/worsening medical issues, or changes in  non-psych medication since last visit.  -Patient maintains regular follow up appointments with other multiple  providers for her complex medical co-morbidities. No report of problem-based visits outside of regularly scheduled maintenance.    _________________________  REVIEW OF SYMPTOMS  -Depression/Mood: negative  -Anxiety: negative  -Psychosis: negative  -Harleen: negative  -ADHD: negative  -ODD: negative  -OCD: negative  -Sexually inappropriate behavior: none reported  -Sleep: No issues reported. No changes from baseline.  -Appetite: No issues reported. No report of pica. No changes from baseline  -Medical ROS: no report of difficulty urinating, polydipsia, or constipation beyond baseline.      ______________________________  History  For detailed history, refer to note from Dr. Swain on 3/19/2024  History reviewed with patient/caregiver and from chart review.    PSYCHIATRIC/BEHAVIORAL HISTORY  -Prior diagnoses:  --Impulse control disorder, not otherwise specified  --Autism spectrum disorder  --Pica of infancy and childhood  --Intellectual disability, profound, or unknown etiology  --Eating disorder  --Stereotypic movement disorder  --Akathisia  -Was previously seeing psychiatrist - Dr. Swain  -History of violence: no  -History of self-injurious behavior: no  -Current psych meds:  --Clonazepam 0.5 mg three times per day (6:00, 14:00, 19:00)  --Metoprolol tartrate 100 mg twice per day (7:00, 19:00)  --Divalproex 100 mg twice per day (sprinkles) (7:00, 19:00)  --Risperidone 1 mg, two (2 mg) at bedtime (19:00)      SOCIAL HISTORY  -Living situation: lives in a group with 7 roommates (all men). Gets along well with roommates. Has been at the home for 8 to 10 years.  -Family involvement: significant involvement. Has regular contact with his sister.  -Guardianship status: Bianca Alegria (a friend of the family)  -Does not endorse smoking, ETOH, or illicit drug use. No reported history of past  substance abuse.      PAST MEDICAL HISTORY  -Chronic medical comorbidities  --Edentulous  --GERD  --History of  constipation  --Intestinal adhesions  --Partial bowel obstruction  --Iron deficiency anemia  --Left foot pain  --Fall in December 2023  --Remote history of seizures  --No reported history of cardiac issues  -PCP: Mariano Hernandez MD  -Follows up with neurology through [facility]      CURRENT MEDICATIONS  -Info reviewed with patient/caregiver    ALLERGIES  -No known drug allergies    PHARMACY  -[facility]    FAMILY HISTORY  -Family history reviewed; Per , father had schizophrenia and 2 siblings has IDD     ______________________  Mental Status Exam:  Appearance: adequate grooming  Eye Contact: avoidant  Demeanor:  withdrawn  Motor Activity: Average, no PMA/PMR.  Musculoskeletal: No TD, tics, or tremor appreciated. AIMS not assessed this visit due to technological limitations   Last AIMS score 4 (chewing-like mvts) 3/2024.  Mood: euthymic  Affect: restricted  Speech: mostly ”non-verbal”,   Thought Process: Unable to fully assess given patient with intellectual/developmental  disability and/or impaired communication abilities.  Thought Content: Unable to fully assess given patient with intellectual/developmental  disability and/or impaired communication abilities.  Thought Perception: Does not appear to be responding to hallucinatory stimuli.  Orientation: alert  Attention/Concentration: average  Cognition:  intellectual disability, estimated as profound per previous psychiatrist  Insight: impaired, in setting of intellectual/developmental disability  Judgement: impaired, in setting of intellectual/developmental disability    ____________________________  Risk Assessment:  Patient felt to be at low acute and imminent risk of harm to self and others based on consideration of their risk and protective factors, as well as evaluation of their current clinical condition. Patient does not currently meet criteria for inpatient psychiatric hospitalization given that they do not exhibit evidence of clinically  significant deterioration in baseline psychiatric illness, aggression, self-injury, or ability to care for themselves. There is no evidence that patient's current caregivers/living environment are unable to safely manage patient's behavior. Outpatient management is currently felt to be appropriate and in the best interest of the patient. Overall risk mitigated by psychiatric follow-up care, use of psychotropic medication, 24/7 supervision, guardianship, and [facility] Board services. Have engaged patient/caregivers in safety planning. They are aware of emergency psychiatric resources available in the event of an acute psychiatric emergency, such as Mobile Crisis, 911, and local ER.        _________________________________  IMPRESSION  Male with intellectual disability, autism, impulse control disorder, and pica presenting for ongoing management of psychotropic medication. Previously followed with Dr. Swain who has retired.    ###  As of this appointment:  -No report of significant change in patient's overall psychiatric and behavioral condition since last visit.  -No report of significant new issues/concerns.  -Currently due for yearly monitoring labs. Will place orders. Have discussed instructions/logistics for getting labs done before next appt with patient/caregiver  -Appears to be tolerating medication regimen without report of significant or bothersome side effects.  -Will continue current medication regimen   -Reviewed logistics regarding scheduling appointments, medication refills, and contacting provider, including providing provider’s cell phone   -Will plan for follow-up in about 2 to 3 months  --Note: patient requires regular follow-up visits, at interval less than every 3 months, to refill controlled substance medication. Discussed this need for close follow-up with patient/caregiver.    In the future, will plan to discuss possible genetic testing given report of 2 sisters with  IDD  ###        Diagnoses:  -Impulse control disorder   -Autism spectrum disorder   -Pica   -Gastroesophageal reflux disease  -Orofacial dyskinesia   -Profound intellectual disability   -Chronic constipation          PLAN / MANAGEMENT / RECOMMENDATIONS    #Visit Complexity   -Visit of high complexity given patient's intellectual/developmental disability and/or  impaired communication abilities resulting in need for the presence of a third party (staff) to provide clinical information and history.    #Medication Management  -Continue:  -Current psych meds:  --Clonazepam 0.5 mg three times per day (6:00, 14:00, 19:00)  --Metoprolol tartrate 100 mg twice per day (7:00, 19:00)  --Divalproex 100 mg twice per day (sprinkles) (7:00, 19:00)  --Risperidone 1 mg, two (2 mg) at bedtime (19:00)      #Medication Considerations   -Medication consent/assent:  Risks, benefits, alternatives, off-label uses, black box warnings, and frequent/important side effects of medications have been discussed with patient/caregiver. To the extent possible, they have voiced understanding and agreement with recommended use of psychotropic medication.    -Medical necessity for continued treatment with psychotropic medication:  []Symptom reduction  []Improvement in functioning  []Reduce risk of harm to self/others  [x]Maintenance therapy to prevent deterioration in functioning    -Rational for not reducing medication dose at this time:  [x]Significant risk of deterioration in functioning  []Concern for elevating risk of harm to self/others  []Prior dose reduction unsuccessful and/or harmful  []Psychiatric/behavioral condition not adequately stabilized/optimized  []Medication regimen recently modified  []Other    -OARRS  --I have personally reviewed patient's OARRS report. I have considered the risks of  abuse, dependence, addiction, and diversion and feel that the potential benefits of  treatment with a controlled substance currently outweigh the  potential risks.    -Risk/Benefit assessment:   here is no report of signs/symptoms consistent with medication-induced impairment in daily functioning. At this time, benefits of medication felt to outweigh potential risks. But we will continue to reassess need for psychotropic medication at regular 3 to 6 month intervals, or sooner as clinically indicated.    #Medication Monitoring Plan  -Yearly monitoring next due: may 2025  --Labs to monitor: CBC, CMP, TSH/T4, lipid panel, Hgb A1c, VPA level  *Patient does not need pre-procedure sedation for labs per     #MDM/Complexity Issues  [x]Chronic medical comorbidities  []Chronic risk of harm to self/others  [x]Intellectual/Developmental disability  [x]Need for independent historian due to intellectual/developmental disability and/or impaired communication abilities  [x]Controlled substance medication requiring regular monitoring  [x]Medication requiring significant ongoing monitoring for toxicity    #Counseling Provided  [x]Diagnostic impression/prognosis  [x]Risks and benefits of treatment options  [x]Instruction for management/treatment and follow-up  [x]Educated patient/caregiver about: behavioral interventions, sleep hygiene, safety planning    #Coordination of care provided  []Family  [x]Caregiver/DSP/Staff  []Agency supervisor  []Nursing staff  []SSA/  []Therapist  []Guardian    #Follow-up  -in about 3 months, or sooner if new/worsening symptoms  >>> Scheduled virtual Follow-up Appt on 4/14/2025 at 15:00    ______________  Time  -Prep time on date of the patient encounter: 10 minutes  -Time spent directly with patient/family/caregiver: 30 minutes  -Additional time spent on patient care activities: 10 minutes  -Documentation time: 10 minutes  -Total time on date of patient encounter: 60 minutes         Scribe Attestation  By signing my name below, I, Loisruben Darden, Scribe   attest that this documentation has been prepared under the  direction and in the presence of Sabrina Alcantar DO.

## 2025-01-20 NOTE — PATIENT INSTRUCTIONS
AFTER VISIT SUMMARY      Date: 1/20/2025  Appointment with Psychiatrist - Dr. Alcantar      Reason for Visit:  -Routine follow-up/Medication management      Discussed during Appointment:   -Physical health, psychiatric/behavioral symptoms, daily functioning, new issues/concerns     -Treatment plan/management, including medication      Clinical Impression/Status Update:  -No report of significant change in patient's overall psychiatric and behavioral condition since last visit.  -No report of significant new issues/concerns.  -Current medication regimen appears to be appropriately effective without experiencing significant or bothersome side effects.  --We will continue current medications and see him again in 3 months.  --Prior to next visit, please get your labs done.    -Risk/Benefit assessment:   Medical necessity for continued treatment with psychotropic medication:   There is no report of signs/symptoms consistent with medication-induced impairment in daily functioning. At this time, benefits of medication felt to outweigh potential risks. But we will continue to reassess need for psychotropic medication at regular 3 to 6 month intervals, or sooner as clinically indicated.      #Clinic Policies/Procedures  -Refills  Prescriptions will be sent to your pharmacy with enough refills to last until your next appointment. For established patients, we typically provide 6 refills for regular meds and 3 refills for controlled substances (e.g., ADHD stimulant medication, benzodiazepines such as lorazepam). We will not provide additional refills beyond that without having an appointment. You can schedule an appointment by sending a [employer] message or calling our office at [phone].     -Paperwork Requests (e.g., Expert Eval for guardianship)  We ask that you please schedule an appointment if needing paperwork filled out by the doctor (e.g., Expert Eval, FMLA form).  Please provide as much information as possible  about your request and email the doctor any forms needing to be filled out prior to the appointment.       ===========================  INSTRUCTIONS/RECOMMENDATIONS  ===========================    #Medication   -No medication changes made today  --Continue taking your psych medications the same as usual    --Refills will be sent to your pharmacy    >>For prior authorization issues at the pharmacy -> Call the office and ask to talk to Nurse Olivas.      #To-Do prior to Next Visit  >> You are due for your annual bloodwork.   >> Please get bloodwork/labs done at least one week prior to your next appointment.    -Bloodwork is necessary to help us identify potential medication side effects and monitor your overall health.   -No appointment or paperwork necessary. But you must be fasting for 12 hours prior to getting your blood drawn.   -Find nearest  lab hours and location here: [email or web address]      If having technical Issues with Epic or [employer]-> Please help us improve the Epic experience  To help identify issues needing to be fixed and improve patient care, please report any issues you experience with Epic or  [employer], such as difficulty connecting to video during virtual visits.  [phone]      #Follow-up  --Your next appointment is scheduled for 4/14/2025 at 3:00 pm (virtual visit with Epic)    *If having new/worsening symptoms, please send a [employer] message or call the clinic ([phone]) to discuss being seen sooner   >>If unable to reach the office, send me an email at [email or web address]

## 2025-01-27 PROBLEM — R56.9 SEIZURE (MULTI): Status: ACTIVE | Noted: 2025-01-27

## 2025-04-05 LAB
25(OH)D3+25(OH)D2 SERPL-MCNC: 70 NG/ML (ref 30–100)
IRON SATN MFR SERPL: 41 % (CALC) (ref 20–48)
IRON SERPL-MCNC: 143 MCG/DL (ref 50–180)
TIBC SERPL-MCNC: 351 MCG/DL (CALC) (ref 250–425)
VALPROATE SERPL-MCNC: 59.1 MG/L (ref 50–100)
VIT B12 SERPL-MCNC: 1019 PG/ML (ref 200–1100)

## 2025-04-14 ENCOUNTER — APPOINTMENT (OUTPATIENT)
Dept: BEHAVIORAL HEALTH | Facility: CLINIC | Age: 50
End: 2025-04-14
Payer: MEDICARE

## 2025-04-14 DIAGNOSIS — F41.3 OTHER MIXED ANXIETY DISORDERS: ICD-10-CM

## 2025-04-14 DIAGNOSIS — Z79.899 ASSESSMENT OF EFFECTS OF PSYCHOTROPIC DRUG IN PATIENT AT RISK FOR METABOLIC SYNDROME: ICD-10-CM

## 2025-04-14 DIAGNOSIS — G25.71 AKATHISIA: ICD-10-CM

## 2025-04-14 DIAGNOSIS — Z79.899 ASSESSMENT OF EFFECTS OF PSYCHOTROPIC DRUG: ICD-10-CM

## 2025-04-14 DIAGNOSIS — Z09 PSYCHIATRIC FOLLOW-UP: ICD-10-CM

## 2025-04-14 DIAGNOSIS — Z01.89 ASSESSMENT OF EFFECTS OF PSYCHOTROPIC DRUG IN PATIENT AT RISK FOR METABOLIC SYNDROME: ICD-10-CM

## 2025-04-14 DIAGNOSIS — F50.89 PICA: ICD-10-CM

## 2025-04-14 DIAGNOSIS — K21.9 GASTROESOPHAGEAL REFLUX DISEASE, UNSPECIFIED WHETHER ESOPHAGITIS PRESENT: ICD-10-CM

## 2025-04-14 DIAGNOSIS — Z86.59 PSYCHIATRIC FOLLOW-UP: ICD-10-CM

## 2025-04-14 DIAGNOSIS — F73 PROFOUND INTELLECTUAL DISABILITY: ICD-10-CM

## 2025-04-14 DIAGNOSIS — Z91.89 AT RISK FOR SIDE EFFECT OF MEDICATION: ICD-10-CM

## 2025-04-14 DIAGNOSIS — R69 MULTIPLE COMORBID CONDITIONS: ICD-10-CM

## 2025-04-14 DIAGNOSIS — F84.0 AUTISM SPECTRUM DISORDER (HHS-HCC): ICD-10-CM

## 2025-04-14 DIAGNOSIS — Z01.89 ASSESSMENT OF EFFECTS OF PSYCHOTROPIC DRUG: ICD-10-CM

## 2025-04-14 DIAGNOSIS — G24.4 OROFACIAL DYSKINESIA: ICD-10-CM

## 2025-04-14 DIAGNOSIS — F63.9 IMPULSE CONTROL DISORDER: ICD-10-CM

## 2025-04-14 DIAGNOSIS — Z79.899 LONG TERM CURRENT USE OF ANTIPSYCHOTIC MEDICATION: ICD-10-CM

## 2025-04-14 DIAGNOSIS — Z86.69 HISTORY OF SEIZURE DISORDER: ICD-10-CM

## 2025-04-14 DIAGNOSIS — K59.09 CHRONIC CONSTIPATION: ICD-10-CM

## 2025-04-14 PROCEDURE — 99214 OFFICE O/P EST MOD 30 MIN: CPT | Performed by: STUDENT IN AN ORGANIZED HEALTH CARE EDUCATION/TRAINING PROGRAM

## 2025-04-14 NOTE — PROGRESS NOTES
Others Attending Appointment:  -Rashaad ()  *Presence necessary as independent historian given patient's intellectual/developmental disability and/or impaired communication abilities    LAST INTERVENTION   -Last seen about 3 months ago in January 2025. No report of significant change in patient's overall psychiatric and behavioral condition. No report of significant new issues/concerns. Labs were ordered. No medication changes.    _____________________________________________  HISTORY OF PRESENT ILLNESS    #Interval Change  -No report of significant new issues/concerns.  -Previously ordered labs for yearly monitoring were partially done  -Saw PCP for annual physical.       #Mood/Irritability  Stable.  -No report of significant changes in mood, crying spells, frequent mood swings, or  significant irritability.  -No report of concern for depression from staff/family.    #Behavior  Stable.  -Pacing described as no worse than baseline.  -Reports pica improved since last appointment.  -No report of physical aggression, significant property destruction, elopement, or self-injurious behavior.      #Anxiety  Stable.  -Pacing described as consistent with baseline.  -No report of excessive worrying, perseverating, or reassurance-seeking behavior.  -No report of significant restlessness, pacing, or other signs suggesting psychomotor  agitation.  -No report of signs/symptoms consistent with separation anxiety or panic attacks.  Recall from prior: Patient reportedly experiences anxiety in setting of being in public places but not worse than usual.      #Medication  -Endorses medication adherence.  -No report of new or significant/bothersome medication side effects.       #Health  -No report of hospitalizations, ED visits, new/worsening medical issues, or changes in non-psych medication since last visit.      _________________________  REVIEW OF SYMPTOMS  -Depression/Mood: negative  -Anxiety: negative  -Psychosis:  negative  -Harleen: negative  -ADHD: negative  -ODD: negative  -OCD: negative  -Sexually inappropriate behavior: none reported  -Sleep: No issues reported. No changes from baseline.  -Appetite: No issues reported. No report of pica. No changes from baseline  -Medical ROS: no report of difficulty urinating, polydipsia, or constipation beyond baseline.      ______________________________  History  For detailed history, refer to note from Dr. Swain on 3/19/2024  History reviewed with patient/caregiver and from chart review.    PSYCHIATRIC/BEHAVIORAL HISTORY  -Prior diagnoses:  --Impulse control disorder, not otherwise specified  --Autism spectrum disorder  --Pica of infancy and childhood  --Intellectual disability, profound, or unknown etiology  --Eating disorder  --Stereotypic movement disorder  --Akathisia  -Was previously seeing psychiatrist - Dr. Swain  -History of violence: no  -History of self-injurious behavior: no  -Current psych meds:  --Clonazepam 0.5 mg three times per day (6:00, 14:00, 19:00)  --Metoprolol tartrate 100 mg twice per day (7:00, 19:00)  --Divalproex 100 mg twice per day (sprinkles) (7:00, 19:00)  --Risperidone 1 mg, two (2 mg) at bedtime (19:00)      SOCIAL HISTORY  -Living situation: lives in a group with 7 roommates (all men). Gets along well with roommates. Has been at the home for 8 to 10 years.  -Family involvement: significant involvement. Has regular contact with his sister.  -Guardianship status: Bianca Alegria (a friend of the family) -   -Does not endorse smoking, ETOH, or illicit drug use. No reported history of past  substance abuse.      PAST MEDICAL HISTORY  -Chronic medical comorbidities  --Edentulous  --GERD  --History of constipation  --Intestinal adhesions  --Partial bowel obstruction  --Iron deficiency anemia  --Left foot pain  --Fall in December 2023  --Remote history of seizures  --No reported history of cardiac issues  -PCP: Mariano Hernandez MD  -Follows up with  neurology through [facility]      CURRENT MEDICATIONS  -Info reviewed with patient/caregiver    ALLERGIES  -No known drug allergies    PHARMACY  -[facility]    FAMILY HISTORY  -Family history reviewed; Per , father () had schizophrenia and 2 siblings have IDD. Mother passed away about 5 years ago.    ______________________  Mental Status Exam:  Appearance: adequate grooming  Eye Contact: avoidant  Demeanor:  withdrawn  Motor Activity: Average, no PMA/PMR.  Musculoskeletal: No TD, tics, or tremor appreciated. AIMS not assessed this visit due to technological limitations   Last AIMS score 4 (chewing-like mvts) 3/2024.  Mood: euthymic  Affect: restricted  Speech: mostly ”non-verbal”,   Thought Process: Unable to fully assess given patient with intellectual/developmental  disability and/or impaired communication abilities.  Thought Content: Unable to fully assess given patient with intellectual/developmental  disability and/or impaired communication abilities.  Thought Perception: Does not appear to be responding to hallucinatory stimuli.  Orientation: alert  Attention/Concentration: average  Cognition:  intellectual disability, estimated as profound per previous psychiatrist  Insight: impaired, in setting of intellectual/developmental disability  Judgement: impaired, in setting of intellectual/developmental disability    ____________________________  Risk Assessment:  Patient felt to be at low acute and imminent risk of harm to self and others based on consideration of their risk and protective factors, as well as evaluation of their current clinical condition. Patient does not currently meet criteria for inpatient psychiatric hospitalization given that they do not exhibit evidence of clinically significant deterioration in baseline psychiatric illness, aggression, self-injury, or ability to care for themselves. There is no evidence that patient's current caregivers/living environment are unable to  safely manage patient's behavior. Outpatient management is currently felt to be appropriate and in the best interest of the patient. Overall risk mitigated by psychiatric follow-up care, use of psychotropic medication, 24/7 supervision, guardianship, and [facility] services. Have engaged patient/caregivers in safety planning. They are aware of emergency psychiatric resources available in the event of an acute psychiatric emergency, such as Mobile Crisis, 911, and local ER.        _________________________________  IMPRESSION  Male with intellectual disability, autism, impulse control disorder, and pica presenting for ongoing management of psychotropic medication. Previously followed with Dr. Swain who has retired.    ###  As of this appointment:  -No significant change in patient's overall psychiatric and behavioral condition since last visit.  -No report of significant new issues/concerns.  -Discussed the possible genetic testing given history of siblings with ID.  -Labs ordered last visit partially completed. Will reorder for next visit.   -Appears to be tolerating medication regimen without report of significant or bothersome side effects.  -Will continue current medication regimen   -Will plan for follow-up in about 3 months  --Note: patient requires regular follow-up visits, at interval less than every 3 months, to refill controlled substance medication.   ###        Diagnoses:  -Impulse control disorder   -Autism spectrum disorder   -Pica  -Other mixed anxiety disorder  -Gastroesophageal reflux disease  -Orofacial dyskinesia   -Profound intellectual disability   -Chronic constipation          PLAN / MANAGEMENT / RECOMMENDATIONS    #Visit Complexity   -Visit of high complexity given patient's intellectual/developmental disability and/or  impaired communication abilities resulting in need for the presence of a third party (staff) to provide clinical information and history.    #Medication  Management  -Continue:  -Current psych meds:  --Clonazepam 0.5 mg three times per day (6:00, 14:00, 19:00)  --Metoprolol tartrate 100 mg twice per day (7:00, 19:00)  --Divalproex 100 mg twice per day (sprinkles) (7:00, 19:00)  --Risperidone 1 mg, two (2 mg) at bedtime (19:00)      #Medication Considerations   -Medication consent/assent:  Risks, benefits, alternatives, off-label uses, black box warnings, and frequent/important side effects of medications have been discussed with patient/caregiver. To the extent possible, they have voiced understanding and agreement with recommended use of psychotropic medication.    -Medical necessity for continued treatment with psychotropic medication:  []Symptom reduction  []Improvement in functioning  []Reduce risk of harm to self/others  [x]Maintenance therapy to prevent deterioration in functioning    -Rational for not reducing medication dose at this time:  [x]Significant risk of deterioration in functioning  []Concern for elevating risk of harm to self/others  []Prior dose reduction unsuccessful and/or harmful  []Psychiatric/behavioral condition not adequately stabilized/optimized  []Medication regimen recently modified  []Other    -OARRS  --I have personally reviewed patient's OARRS report. I have considered the risks of  abuse, dependence, addiction, and diversion and feel that the potential benefits of  treatment with a controlled substance currently outweigh the potential risks.    -Risk/Benefit assessment:   here is no report of signs/symptoms consistent with medication-induced impairment in daily functioning. At this time, benefits of medication felt to outweigh potential risks. But we will continue to reassess need for psychotropic medication at regular 3 to 6 month intervals, or sooner as clinically indicated.    #Medication Monitoring Plan  -Yearly monitoring next due: may 2025  --Labs to monitor: CBC, CMP, TSH/T4, lipid panel, Hgb A1c, VPA level  *Patient does not  need pre-procedure sedation for labs per     #MDM/Complexity Issues  [x]Chronic medical comorbidities  []Chronic risk of harm to self/others  [x]Intellectual/Developmental disability  [x]Need for independent historian due to intellectual/developmental disability and/or impaired communication abilities  [x]Controlled substance medication requiring regular monitoring  [x]Medication requiring significant ongoing monitoring for toxicity    #Counseling Provided  [x]Diagnostic impression/prognosis  [x]Risks and benefits of treatment options  [x]Instruction for management/treatment and follow-up  [x]Educated patient/caregiver about: behavioral interventions, sleep hygiene, safety planning    #Coordination of care provided  []Family  [x]Caregiver/DSP/Staff  []Agency supervisor  []Nursing staff  []SSA/  []Therapist  []Guardian    #Follow-up  -in about 3 to 4 months, or sooner if new/worsening symptoms  >>> Scheduled virtual Follow-up Appt on 8/18/2025 at 15:00    ______________  Time  -Prep time on date of the patient encounter: 10 minutes  -Time spent directly with patient/family/caregiver: 30 minutes  -Additional time spent on patient care activities: 10 minutes  -Documentation time: 10 minutes  -Total time on date of patient encounter: 60 minutes         Scribe Attestation  By signing my name below, I, Lois Darden, Scribricha   attest that this documentation has been prepared under the direction and in the presence of Sabrina Alcantar DO.

## 2025-04-14 NOTE — PATIENT INSTRUCTIONS
AFTER VISIT SUMMARY      Date: 4/14/2025  Appointment with Psychiatrist - Dr. Alcantar      Reason for Visit:  -Routine follow-up/Medication management      Discussed during Appointment:   -Physical health, psychiatric/behavioral symptoms, daily functioning, new issues/concerns     -Treatment plan/management, including medication      Clinical Impression/Status Update:  Patient had a brief period of increase of his pica in setting of staff changes and changes in his routine.  Labs were ordered last visit. Some where done and some were not.   Discussed the potential benefits of genetic testing given history of siblings with ID.  -Current medication regimen appears to be appropriately effective without experiencing significant or bothersome side effects.  --We will continue current medications and see him again in 3 to 4 months.  --Prior to next visit, please get your labs done.    -Risk/Benefit assessment:   Medical necessity for continued treatment with psychotropic medication:   There is no report of signs/symptoms consistent with medication-induced impairment in daily functioning. At this time, benefits of medication felt to outweigh potential risks. But we will continue to reassess need for psychotropic medication at regular 3 to 6 month intervals, or sooner as clinically indicated.      #Clinic Policies/Procedures  -Refills  Prescriptions will be sent to your pharmacy with enough refills to last until your next appointment. For established patients, we typically provide 6 refills for regular meds and 3 refills for controlled substances (e.g., ADHD stimulant medication, benzodiazepines such as lorazepam). We will not provide additional refills beyond that without having an appointment. You can schedule an appointment by sending a [employer] message or calling our office at [phone].     -Paperwork Requests (e.g., Expert Eval for guardianship)  We ask that you please schedule an appointment if needing paperwork  filled out by the doctor (e.g., Expert Eval, FMLA form).  Please provide as much information as possible about your request and email the doctor any forms needing to be filled out prior to the appointment.       ===========================  INSTRUCTIONS/RECOMMENDATIONS  ===========================    #Medication   -No medication changes made today  --Continue taking your psych medications the same as usual    --Refills will be sent to your pharmacy    >>For prior authorization issues at the pharmacy -> Call the office and ask to talk to Nurse Olivas.      #To-Do prior to Next Visit  >> You are due for your annual bloodwork.   >> Please get bloodwork/labs done at least one week prior to your next appointment.    -Bloodwork is necessary to help us identify potential medication side effects and monitor your overall health.   -No appointment or paperwork necessary. But you must be fasting for 12 hours prior to getting your blood drawn.   -Find nearest  lab hours and location here: [email or web address]      If having technical Issues with Epic or [employer]-> Please help us improve the Epic experience  To help identify issues needing to be fixed and improve patient care, please report any issues you experience with Epic or  [employer], such as difficulty connecting to video during virtual visits.  [phone]      #Follow-up  --Your next appointment is scheduled for 8/18/2025 at 3:00 pm (virtual visit with [employer])    *If having new/worsening symptoms, please send a [employer] message or call the clinic ([phone]) to discuss being seen sooner   >>If unable to reach the office, send me an email at [email or web address]

## 2025-04-28 RX ORDER — CLONAZEPAM 0.5 MG/1
TABLET ORAL
Qty: 90 TABLET | Refills: 2 | Status: SHIPPED | OUTPATIENT
Start: 2025-04-28

## 2025-04-28 RX ORDER — DIVALPROEX SODIUM 125 MG/1
CAPSULE, COATED PELLETS ORAL
Qty: 480 CAPSULE | Refills: 6 | Status: SHIPPED | OUTPATIENT
Start: 2025-04-28

## 2025-04-28 RX ORDER — METOPROLOL TARTRATE 100 MG/1
TABLET ORAL
Qty: 60 TABLET | Refills: 6 | Status: SHIPPED | OUTPATIENT
Start: 2025-04-28

## 2025-04-28 RX ORDER — RISPERIDONE 2 MG/1
TABLET ORAL
Qty: 30 TABLET | Refills: 6 | Status: SHIPPED | OUTPATIENT
Start: 2025-04-28

## 2025-06-14 LAB
ALBUMIN SERPL-MCNC: 4.4 G/DL (ref 3.6–5.1)
ALP SERPL-CCNC: 54 U/L (ref 36–130)
ALT SERPL-CCNC: 18 U/L (ref 9–46)
ANION GAP SERPL CALCULATED.4IONS-SCNC: 11 MMOL/L (CALC) (ref 7–17)
AST SERPL-CCNC: 17 U/L (ref 10–40)
BASOPHILS # BLD AUTO: 41 CELLS/UL (ref 0–200)
BASOPHILS NFR BLD AUTO: 0.6 %
BILIRUB SERPL-MCNC: 0.5 MG/DL (ref 0.2–1.2)
BUN SERPL-MCNC: 20 MG/DL (ref 7–25)
CALCIUM SERPL-MCNC: 8.9 MG/DL (ref 8.6–10.3)
CHLORIDE SERPL-SCNC: 105 MMOL/L (ref 98–110)
CHOLEST SERPL-MCNC: 149 MG/DL
CHOLEST/HDLC SERPL: 3.2 (CALC)
CO2 SERPL-SCNC: 26 MMOL/L (ref 20–32)
CREAT SERPL-MCNC: 0.85 MG/DL (ref 0.6–1.29)
EGFRCR SERPLBLD CKD-EPI 2021: 108 ML/MIN/1.73M2
EOSINOPHIL # BLD AUTO: 83 CELLS/UL (ref 15–500)
EOSINOPHIL NFR BLD AUTO: 1.2 %
ERYTHROCYTE [DISTWIDTH] IN BLOOD BY AUTOMATED COUNT: 13.3 % (ref 11–15)
EST. AVERAGE GLUCOSE BLD GHB EST-MCNC: 111 MG/DL
EST. AVERAGE GLUCOSE BLD GHB EST-SCNC: 6.2 MMOL/L
GLUCOSE SERPL-MCNC: 88 MG/DL (ref 65–99)
HBA1C MFR BLD: 5.5 %
HCT VFR BLD AUTO: 50.7 % (ref 38.5–50)
HDLC SERPL-MCNC: 46 MG/DL
HGB BLD-MCNC: 16.1 G/DL (ref 13.2–17.1)
LDLC SERPL CALC-MCNC: 82 MG/DL (CALC)
LYMPHOCYTES # BLD AUTO: 1925 CELLS/UL (ref 850–3900)
LYMPHOCYTES NFR BLD AUTO: 27.9 %
MAGNESIUM SERPL-MCNC: 2.1 MG/DL (ref 1.5–2.5)
MCH RBC QN AUTO: 31.5 PG (ref 27–33)
MCHC RBC AUTO-ENTMCNC: 31.8 G/DL (ref 32–36)
MCV RBC AUTO: 99.2 FL (ref 80–100)
MONOCYTES # BLD AUTO: 669 CELLS/UL (ref 200–950)
MONOCYTES NFR BLD AUTO: 9.7 %
NEUTROPHILS # BLD AUTO: 4181 CELLS/UL (ref 1500–7800)
NEUTROPHILS NFR BLD AUTO: 60.6 %
NONHDLC SERPL-MCNC: 103 MG/DL (CALC)
PLATELET # BLD AUTO: 235 THOUSAND/UL (ref 140–400)
PMV BLD REES-ECKER: 9.5 FL (ref 7.5–12.5)
POTASSIUM SERPL-SCNC: 4.2 MMOL/L (ref 3.5–5.3)
PROT SERPL-MCNC: 7 G/DL (ref 6.1–8.1)
RBC # BLD AUTO: 5.11 MILLION/UL (ref 4.2–5.8)
SODIUM SERPL-SCNC: 142 MMOL/L (ref 135–146)
T4 FREE SERPL-MCNC: 1 NG/DL (ref 0.8–1.8)
TRIGL SERPL-MCNC: 117 MG/DL
TSH SERPL-ACNC: 2.48 MIU/L (ref 0.4–4.5)
WBC # BLD AUTO: 6.9 THOUSAND/UL (ref 3.8–10.8)

## 2025-08-18 ENCOUNTER — APPOINTMENT (OUTPATIENT)
Dept: BEHAVIORAL HEALTH | Facility: CLINIC | Age: 50
End: 2025-08-18
Payer: MEDICARE

## 2025-11-24 ENCOUNTER — APPOINTMENT (OUTPATIENT)
Dept: BEHAVIORAL HEALTH | Facility: CLINIC | Age: 50
End: 2025-11-24
Payer: MEDICARE